# Patient Record
Sex: FEMALE | Race: WHITE | NOT HISPANIC OR LATINO | Employment: UNEMPLOYED | URBAN - METROPOLITAN AREA
[De-identification: names, ages, dates, MRNs, and addresses within clinical notes are randomized per-mention and may not be internally consistent; named-entity substitution may affect disease eponyms.]

---

## 2021-05-30 ENCOUNTER — RECORDS - HEALTHEAST (OUTPATIENT)
Dept: ADMINISTRATIVE | Facility: CLINIC | Age: 46
End: 2021-05-30

## 2021-11-27 ENCOUNTER — HOSPITAL ENCOUNTER (EMERGENCY)
Facility: CLINIC | Age: 46
Discharge: HOME | End: 2021-11-27
Admitting: PHYSICIAN ASSISTANT
Payer: COMMERCIAL

## 2021-11-27 VITALS
SYSTOLIC BLOOD PRESSURE: 124 MMHG | OXYGEN SATURATION: 98 % | RESPIRATION RATE: 16 BRPM | WEIGHT: 150 LBS | DIASTOLIC BLOOD PRESSURE: 86 MMHG | HEART RATE: 94 BPM | TEMPERATURE: 98.1 F

## 2021-11-27 DIAGNOSIS — L03.90 CELLULITIS: ICD-10-CM

## 2021-11-27 PROCEDURE — 99284 EMERGENCY DEPT VISIT MOD MDM: CPT

## 2021-11-27 RX ORDER — CEPHALEXIN 500 MG/1
500 CAPSULE ORAL 4 TIMES DAILY
Qty: 40 CAPSULE | Refills: 0 | Status: SHIPPED | OUTPATIENT
Start: 2021-11-27 | End: 2021-12-07

## 2021-11-27 RX ORDER — SULFAMETHOXAZOLE/TRIMETHOPRIM 800-160 MG
1 TABLET ORAL 2 TIMES DAILY
Qty: 20 TABLET | Refills: 0 | Status: SHIPPED | OUTPATIENT
Start: 2021-11-27 | End: 2021-12-07

## 2021-11-27 ASSESSMENT — ENCOUNTER SYMPTOMS: FACIAL SWELLING: 1

## 2021-11-28 NOTE — DISCHARGE INSTRUCTIONS
Please place warm compress over the chin area 3-4 times a day to help bring the infection out to the surface of the skin.  Please take the antibiotics as directed.  If you feel that the pains, swelling is extending into the mouth, involving the throat or the tongue please return emergently.  I suspect that your symptoms should improve likely in the next 48 to 72 hours.  Please take the antibiotics to completion.

## 2021-11-28 NOTE — ED PROVIDER NOTES
EMERGENCY DEPARTMENT ENCOUNTER      NAME: Carolyn Chan  AGE: 46 year old female  YOB: 1975  MRN: [id number]  EVALUATION DATE & TIME: 11/27/2021  5:32 PM    PCP: No primary care provider on file.    ED PROVIDER: Judson Saez PA-C      Chief Complaint   Patient presents with     Mass         FINAL IMPRESSION:  1. Cellulitis          MEDICAL DECISION MAKING:    Pertinent Labs & Imaging studies reviewed. (See chart for details)  46 year old female presents to the Emergency Department for evaluation of swelling, redness, warmth, tenderness to anterior chin.    After obtaining history of present illness, reviewing vitals and examined the patient it does appear that she has a start of a cellulitis on her chin that may be an early abscess.  No fluctuance, at this point time I would not recommend any type in imaging incision and drainage.  I counseled the patient regarding warm compresses and will initiate antibiotic therapy.  Counseled the patient that she should see improvement of symptoms in the next 48 to 72 hours.        ED COURSE    6:07 PM I met with the patient, obtained history, performed an initial exam, and discussed options and plan for diagnostics and treatment here in the ED. We discussed the plan for discharge and the patient is agreeable. Reviewed supportive cares, symptomatic treatment, outpatient follow up, and reasons to return to the Emergency Department. Patient to be discharged by ED RN.     At the conclusion of the encounter I discussed the results of all of the tests and the disposition. The questions were answered. The patient or family acknowledged understanding and was agreeable with the care plan.     MEDICATIONS GIVEN IN THE EMERGENCY:  Medications - No data to display    NEW PRESCRIPTIONS STARTED AT TODAY'S ER VISIT  Discharge Medication List as of 11/27/2021  6:24 PM      START taking these medications    Details   cephALEXin (KEFLEX) 500 MG capsule Take 1 capsule (500 mg)  by mouth 4 times daily for 10 days, Disp-40 capsule, R-0, Local Print      sulfamethoxazole-trimethoprim (BACTRIM DS) 800-160 MG tablet Take 1 tablet by mouth 2 times daily for 10 days, Disp-20 tablet, R-0, Local Print                  =================================================================    HPI    Patient information was obtained from: Patient    Use of Interpretor: N/A       Carolyn Chan is a 46 year old female with no pertinent history who presents to this ED via walk-in for evaluation of chin swelling.    Patient reports redness, swelling, and tenderness to chin that she noticed yesterday. She denies any recent injuries, dental pain, history of MRSA, recurrent skin infections, and allergies to medications. No other complaints at this time.    REVIEW OF SYSTEMS   Review of Systems   HENT: Positive for facial swelling (chin). Negative for dental problem.         Positive for redness and tenderness to chin   All other systems reviewed and are negative.     PAST MEDICAL HISTORY:  History reviewed. No pertinent past medical history.    PAST SURGICAL HISTORY:  History reviewed. No pertinent surgical history.      CURRENT MEDICATIONS:    No current facility-administered medications for this encounter.    Current Outpatient Medications:      cephALEXin (KEFLEX) 500 MG capsule, Take 1 capsule (500 mg) by mouth 4 times daily for 10 days, Disp: 40 capsule, Rfl: 0     sulfamethoxazole-trimethoprim (BACTRIM DS) 800-160 MG tablet, Take 1 tablet by mouth 2 times daily for 10 days, Disp: 20 tablet, Rfl: 0      ALLERGIES:  No Known Allergies    FAMILY HISTORY:  History reviewed. No pertinent family history.    SOCIAL HISTORY:   Social History     Socioeconomic History     Marital status: Single     Spouse name: Not on file     Number of children: Not on file     Years of education: Not on file     Highest education level: Not on file   Occupational History     Not on file   Tobacco Use     Smoking status: Not  on file     Smokeless tobacco: Not on file   Substance and Sexual Activity     Alcohol use: Not on file     Drug use: Not on file     Sexual activity: Not on file   Other Topics Concern     Not on file   Social History Narrative     Not on file     Social Determinants of Health     Financial Resource Strain: Not on file   Food Insecurity: Not on file   Transportation Needs: Not on file   Physical Activity: Not on file   Stress: Not on file   Social Connections: Not on file   Intimate Partner Violence: Not on file   Housing Stability: Not on file       VITALS:  Patient Vitals for the past 24 hrs:   BP Temp Temp src Pulse Resp SpO2 Weight   11/27/21 1738 124/86 98.1  F (36.7  C) Oral 94 16 98 % 68 kg (150 lb)       PHYSICAL EXAM    Physical Exam  Vitals and nursing note reviewed.   Constitutional:       General: She is not in acute distress.     Appearance: She is normal weight. She is not ill-appearing or toxic-appearing.   HENT:      Head: Normocephalic.      Right Ear: External ear normal.      Left Ear: External ear normal.      Nose: Nose normal.      Mouth/Throat:      Mouth: Mucous membranes are moist.      Pharynx: Oropharynx is clear.      Comments: There is no induration or tenderness to palpation of the floor of the mouth.  Tongue is not swollen.  No evidence of intraoral lesions.  There is evidence of diffuse dental decay and missing multiple teeth.  Eyes:      Conjunctiva/sclera: Conjunctivae normal.   Cardiovascular:      Rate and Rhythm: Normal rate.   Pulmonary:      Effort: Pulmonary effort is normal. No respiratory distress.      Breath sounds: No stridor.   Musculoskeletal:         General: No deformity or signs of injury. Normal range of motion.      Cervical back: Normal range of motion. No rigidity.   Lymphadenopathy:      Cervical: No cervical adenopathy.   Skin:     General: Skin is warm and dry.      Comments: Patient does have erythema, warmth, tenderness to the anterior portion of the chin  and there is a central punctate lesion that looks like it may become the head of an abscess.  There is no fluctuance.  No swelling or tenderness to the inferior portion of the mandible, nothing that extends into the neck.  Findings consistent with likely early cellulitis of the chin from a small early abscess.   Neurological:      General: No focal deficit present.      Mental Status: She is alert. Mental status is at baseline.      Sensory: No sensory deficit.      Motor: No weakness.   Psychiatric:         Mood and Affect: Mood normal.         Behavior: Behavior normal.          LAB:  All pertinent labs reviewed and interpreted.       RADIOLOGY:  Reviewed all pertinent imaging. Please see official radiology report.  No orders to display             Nancy MADDEN, am serving as a scribe to document services personally performed by Judson Saez PA-C based on my observation and the provider's statements to me. IJudson PA-C attest that Nancy Gonzales is acting in a scribe capacity, has observed my performance of the services and has documented them in accordance with my direction.    Judson Saez PA-C  Emergency Medicine  [facility]     Judson Saez PA-C  11/27/21 1922

## 2021-12-01 ENCOUNTER — HOSPITAL ENCOUNTER (EMERGENCY)
Facility: CLINIC | Age: 46
Discharge: HOME | End: 2021-12-02
Attending: EMERGENCY MEDICINE | Admitting: EMERGENCY MEDICINE
Payer: COMMERCIAL

## 2021-12-01 DIAGNOSIS — L02.01 FACIAL ABSCESS: ICD-10-CM

## 2021-12-01 PROCEDURE — 99283 EMERGENCY DEPT VISIT LOW MDM: CPT | Mod: 25

## 2021-12-01 PROCEDURE — 250N000009 HC RX 250: Performed by: EMERGENCY MEDICINE

## 2021-12-01 PROCEDURE — 10060 I&D ABSCESS SIMPLE/SINGLE: CPT

## 2021-12-01 RX ADMIN — Medication 3 ML: at 21:43

## 2021-12-01 ASSESSMENT — ENCOUNTER SYMPTOMS
FACIAL SWELLING: 1
NERVOUS/ANXIOUS: 0

## 2021-12-02 VITALS
SYSTOLIC BLOOD PRESSURE: 132 MMHG | DIASTOLIC BLOOD PRESSURE: 85 MMHG | TEMPERATURE: 98.2 F | RESPIRATION RATE: 16 BRPM | OXYGEN SATURATION: 98 % | HEART RATE: 100 BPM | WEIGHT: 150 LBS

## 2021-12-02 NOTE — ED TRIAGE NOTES
"Pt presents to the ED with C/O of rash on chin that started on the 25th of November, was prescribed antibiotics for it, but \"its not getting any better\"  "

## 2021-12-02 NOTE — ED PROVIDER NOTES
Emergency Department Encounter      NAME: Carolyn Chan  AGE: 46 year old female  YOB: 1975  MRN: [id number]  EVALUATION DATE & TIME: 2021  8:25 PM    PCP: System, Provider Not In    ED PROVIDER: Cuco Chisholm M.D.      Chief Complaint   Patient presents with     chin rash         FINAL IMPRESSION:  1. Facial abscess        MEDICAL DECISION MAKIN:10 PM I met with the patient, obtained history, performed an initial exam, and discussed options and plan for diagnostics and treatment here in the ED.   PPE: Provider wore gloves, and paper mask.     This patient is a 46-year-old female who presents with a rash on her chin.  She was seen on  for the chin swelling and it was thought that she might have an early abscess at that time.  She was started on Keflex and Bactrim.  She has been using the antibiotics regularly but says that the swelling and pain in the area has worsened.  She says that the tightness in the area of her chin makes it difficult for her to open her mouth.  On exam she appeared to have a abscess that was starting to point.  Patient was quite nervous the I&D procedure.  I used LAT initially and then injected lidocaine subcutaneously initially and then deeper.  I used an 18-gauge needle to probe the abscess and I was able to aspirate several cc of tannish-green thick purulent material.  After this I did a stab incision with a #15 blade and was able to get additional purulent material at all.  However the patient was not willing for me to enlarge the size of the incision or to pack the area.  Instead I was able to express as much of the purulent material as I could.  The patient wanted to finish the course of antibiotics and see if this would resolve the abscess instead of doing a more thorough job today.    Pertinent Labs & Imaging studies reviewed. (See chart for details)           The importance of close follow up was discussed. We reviewed warning signs and  "symptoms, and I instructed Ms. Chan to return to the emergency department immediately if she develops any new or worsening symptoms. I provided additional verbal discharge instructions. Ms. Chan expressed understanding and agreement with this plan of care, her questions were answered, and she was discharged in stable condition.         MEDICATIONS GIVEN IN THE EMERGENCY:  Medications   lidocaine/EPINEPHrine/tetracaine (LET) solution KIT 3 mL (3 mLs Topical Given 12/1/21 2143)       NEW PRESCRIPTIONS STARTED AT TODAY'S ER VISIT:  New Prescriptions    No medications on file          =================================================================    HPI    Patient information was obtained from: the patient     Use of : N/A      Carolyn Chan is a 46 year old female with no recorded past medical history, who presents to the ED via walk in for evaluation of a chin rash.     Per Chart Review:   11/27/2021 the patient presented to [facility] ED for evaluation of chin swelling. Her chin swelling began two days prior to presenting (11/25). She was discharged with Keflex and Bactrim for the start of cellulitis on her chin that might be an early abscess.     The patient reports that her chin swelling and pain has continued to worsen despite the two antibiotics she has been taking. Today the swelling has started moving into her jaw and gums, making it difficult to open her mouth. She also endorses little red spots on the bottom of her chin. She states that she \"wants it to pop,\" and is nervous that this swelling is cancer. The patient denies dental pain, and any other symptoms or complaints at this time.     REVIEW OF SYSTEMS   Review of Systems   Constitutional: Negative for chills, diaphoresis, fatigue and fever.   HENT: Positive for facial swelling (chin, jaw, gums). Negative for dental problem, sinus pressure, sinus pain and sore throat.         Positive for chin, jaw, and gums pain, trismus   Eyes: " Negative for pain, discharge, redness and itching.   Respiratory: Negative for chest tightness and shortness of breath.    Cardiovascular: Negative for chest pain.   Gastrointestinal: Negative for abdominal pain, nausea and vomiting.   Skin: Positive for rash (bottom of chin).   Psychiatric/Behavioral: The patient is not nervous/anxious.    All other systems reviewed and are negative.       PAST MEDICAL HISTORY:  History reviewed. No pertinent past medical history.    PAST SURGICAL HISTORY:  History reviewed. No pertinent surgical history.    CURRENT MEDICATIONS:    No current facility-administered medications for this encounter.    Current Outpatient Medications:      cephALEXin (KEFLEX) 500 MG capsule, Take 1 capsule (500 mg) by mouth 4 times daily for 10 days, Disp: 40 capsule, Rfl: 0     sulfamethoxazole-trimethoprim (BACTRIM DS) 800-160 MG tablet, Take 1 tablet by mouth 2 times daily for 10 days, Disp: 20 tablet, Rfl: 0    ALLERGIES:  No Known Allergies    FAMILY HISTORY:  History reviewed. No pertinent family history.    SOCIAL HISTORY:   Social History     Socioeconomic History     Marital status: Single     Spouse name: Not on file     Number of children: Not on file     Years of education: Not on file     Highest education level: Not on file   Occupational History     Not on file   Tobacco Use     Smoking status: Not on file     Smokeless tobacco: Not on file   Substance and Sexual Activity     Alcohol use: Not on file     Drug use: Not on file     Sexual activity: Not on file   Other Topics Concern     Not on file   Social History Narrative     Not on file     Social Determinants of Health     Financial Resource Strain: Not on file   Food Insecurity: Not on file   Transportation Needs: Not on file   Physical Activity: Not on file   Stress: Not on file   Social Connections: Not on file   Intimate Partner Violence: Not on file   Housing Stability: Not on file       PHYSICAL EXAM:    Vitals: BP (!) 155/96    Pulse 100   Temp 98.6  F (37  C) (Oral)   Resp 16   Wt 68 kg (150 lb)   LMP 11/17/2021   SpO2 100%    Constitutional: Well developed, well nourished. Comfortable appearing.  HENT: Tense, 3 cm swelling on the bottom right side of the chin which is warm, erythematous and pointing. Poor dentition but no obvious dental abscess. Sublingual area is normal.  The posterior pharynx is clear without any sign of erythema or swelling.  Normocephalic, atraumatic, mucous membranes moist, nose normal.   Neck- Supple, gross ROM intact.  No JVD.  No lymphadenopathy.  Normal thyroid.  Eyes: Pupils mid-range, sclera white, no discharge  Respiratory: Clear to auscultation bilaterally, no respiratory distress, no wheezing, speaks full sentences easily.  Cardiovascular: Normal heart rate, regular rhythm, no murmurs. No lower extremity edema, 2+ DP pulses.   GI: Soft, no tenderness to deep palpation in all quadrants, no masses.  Musculoskeletal: Moving all 4 extremities intentionally and without pain. No obvious deformity.  Skin: Warm, dry, no rash.     LAB:  All pertinent labs reviewed and interpreted.  Labs Ordered and Resulted from Time of ED Arrival to Time of ED Departure - No data to display    RADIOLOGY:  No orders to display       EKG:   None.     PROCEDURES:   [facility]    PROCEDURE: -Incision/Drainage    Date/Time: 12/1/2021 11:08 PM  Performed by: Cuco Chisholm MD  Authorized by: Cuco Chisholm MD     Risks, benefits and alternatives discussed.      LOCATION:      Type:  Abscess    Size:  3 cm    Location:  Head    Head/neck location: Chin.    PRE-PROCEDURE DETAILS:     Skin preparation:  Betadine    PROCEDURE TYPE:     Complexity:  Simple    ANESTHESIA (see MAR for exact dosages):     Anesthesia method:  Topical application and local infiltration    Topical anesthetic:  LET    Local anesthetic:  Lidocaine 1% WITH epi    PROCEDURE DETAILS:     Needle aspiration: yes      Needle size:   18 G    Incision types:  Stab incision    Incision depth:  Subcutaneous    Scalpel blade:  15    Drainage:  Purulent    Drainage amount:  Moderate    Wound treatment:  Wound left open    Packing materials:  None    PROCEDURE    Patient Tolerance:  Patient tolerated the procedure well with no immediate complications         I, Denia López, am serving as a scribe to document services personally performed by Dr. Cuco Chisholm based on my observation and the provider's statements to me. I, Cuco Chisholm M.D. attest that Denia López is acting in a scribe capacity, has observed my performance of the services and has documented them in accordance with my direction.      Cuco Chisholm M.D.  Emergency Medicine  [facility] EMERGENCY ROOM  [address] [address]  [phone]  Dept: [phone]       Cuco Chisholm MD  12/14/21 7627

## 2021-12-14 ASSESSMENT — ENCOUNTER SYMPTOMS
NAUSEA: 0
VOMITING: 0
SINUS PAIN: 0
SINUS PRESSURE: 0
EYE DISCHARGE: 0
SORE THROAT: 0
EYE PAIN: 0
CHILLS: 0
ABDOMINAL PAIN: 0
CHEST TIGHTNESS: 0
EYE REDNESS: 0
EYE ITCHING: 0
DIAPHORESIS: 0
SHORTNESS OF BREATH: 0
FATIGUE: 0
FEVER: 0

## 2022-03-10 ENCOUNTER — HOSPITAL ENCOUNTER (EMERGENCY)
Facility: CLINIC | Age: 47
Discharge: HOME | End: 2022-03-10
Attending: EMERGENCY MEDICINE | Admitting: EMERGENCY MEDICINE
Payer: COMMERCIAL

## 2022-03-10 VITALS
TEMPERATURE: 99 F | WEIGHT: 154 LBS | OXYGEN SATURATION: 98 % | SYSTOLIC BLOOD PRESSURE: 153 MMHG | DIASTOLIC BLOOD PRESSURE: 97 MMHG | HEIGHT: 67 IN | HEART RATE: 99 BPM | RESPIRATION RATE: 18 BRPM | BODY MASS INDEX: 24.17 KG/M2

## 2022-03-10 DIAGNOSIS — L02.01 ABSCESS OF CHIN: ICD-10-CM

## 2022-03-10 DIAGNOSIS — L03.90 CELLULITIS, UNSPECIFIED CELLULITIS SITE: ICD-10-CM

## 2022-03-10 PROCEDURE — 99284 EMERGENCY DEPT VISIT MOD MDM: CPT

## 2022-03-10 PROCEDURE — 250N000013 HC RX MED GY IP 250 OP 250 PS 637: Performed by: EMERGENCY MEDICINE

## 2022-03-10 RX ORDER — DOXYCYCLINE HYCLATE 50 MG/1
100 CAPSULE ORAL ONCE
Status: COMPLETED | OUTPATIENT
Start: 2022-03-10 | End: 2022-03-10

## 2022-03-10 RX ORDER — DOXYCYCLINE 100 MG/1
100 CAPSULE ORAL 2 TIMES DAILY
Qty: 20 CAPSULE | Refills: 0 | Status: SHIPPED | OUTPATIENT
Start: 2022-03-10 | End: 2022-03-20

## 2022-03-10 RX ORDER — CEPHALEXIN 500 MG/1
500 CAPSULE ORAL 4 TIMES DAILY
Qty: 40 CAPSULE | Refills: 0 | Status: SHIPPED | OUTPATIENT
Start: 2022-03-10 | End: 2022-03-20

## 2022-03-10 RX ORDER — CEPHALEXIN 500 MG/1
500 CAPSULE ORAL ONCE
Status: COMPLETED | OUTPATIENT
Start: 2022-03-10 | End: 2022-03-10

## 2022-03-10 RX ADMIN — CEPHALEXIN 500 MG: 500 CAPSULE ORAL at 01:49

## 2022-03-10 RX ADMIN — DOXYCYCLINE HYCLATE 100 MG: 50 CAPSULE ORAL at 01:49

## 2022-03-10 ASSESSMENT — ENCOUNTER SYMPTOMS: WOUND: 1

## 2022-03-10 NOTE — DISCHARGE INSTRUCTIONS
Please make an appointment with dermatology as discussed you will likely need to have this area opened and the complete sac removed in order to prevent recurrent abscesses in this area.  Otherwise please use warm compresses on the area to encourage drainage, take antibiotics as prescribed.  Return for any concerns.

## 2022-03-10 NOTE — ED TRIAGE NOTES
"Pt arrives with complaints of wound to chin. Pt was seen here in December for wound, it was lanced and pt states, \"It was doing better and than the last few days it was getting more swollen and now it had some pus come out.\" Denies any fever.   "

## 2022-03-10 NOTE — ED PROVIDER NOTES
EMERGENCY DEPARTMENT ENCOUNTER      NAME: Carolyn Chan  AGE: 46 year old female  YOB: 1975  MRN: [id number]  EVALUATION DATE & TIME: 3/10/2022  1:14 AM    PCP: System, Provider Not In    ED PROVIDER: Kandi Garland M.D.      Chief Complaint   Patient presents with     Wound Check         FINAL IMPRESSION:  1. Abscess of chin    2. Cellulitis, unspecified cellulitis site        MEDICAL DECISION MAKING:    Pertinent Labs & Imaging studies reviewed. (See chart for details)  ED Course as of 03/10/22 0452   Thu Mar 10, 2022   0126 Afebrile.  Vital signs here with some slight tachycardia, mild hypertension.  Patient is coming in for evaluation of recurrent abscess to chin.  Is been ongoing for the last several months.  She fell initially and stabbed her chin with a bit of steel wool.  She has had multiple recurrent abscesses in the area.  Has not been to see a dermatologist.  Has underwent gone several rounds of antibiotics without any significant improvement.  Has had multiple I&D's but states that the area comes back.  Noted today that it became more red, slightly more swollen and she had some pus that drained from the area.Physical exam for patient here without any acute cardiopulmonary distress.  She is swelling to her anterior chin with a small area just to the right of midline where she has a dark eschar and slightly indented area to her chin with some surrounding erythema, warmth.  Area around this indurated without any significant fluctuance.  No drainage at this point.Explained to patient this is likely recurrent abscess and she needs to see dermatology so that they can try and get the sac out and this is likely why it keeps reoccurring.  It does look like she is developing some slight cellulitis possibly around this area.  With this dark eschar I am concerned for possible MRSA.  She has in the past been on Keflex and Bactrim, will try Keflex and doxy as she stated that the Bactrim did not  help.  Otherwise will discharge with instruction use warm compresses, take antibiotics and to follow-up with dermatology.           Critical care: 0 minutes excluding separately billable procedures.  Includes bedside management, time reviewing test results, review of records, discussing the case with staff, documenting the medical record and time spent with family members (or surrogate decision makers) discussing specific treatment issues.          ED COURSE:      1:21 AM  I met with the patient for the initial interview and physical examination. Discussed plan for treatment and workup in the ED.       1:28 AM I discussed the plan for discharge with the patient, and patient is agreeable. We discussed supportive cares at home and reasons for return to the ER including new or worsening symptoms - all questions and concerns addressed. Patient to be discharged by RN.    The importance of close follow up was discussed. We reviewed warning signs and symptoms, and I instructed Ms. Chan to return to the emergency department immediately if she develops any new or worsening symptoms. I provided additional verbal discharge instructions. Ms. Chan expressed understanding and agreement with this plan of care, her questions were answered, and she was discharged in stable condition.       PPE: Provider wore gloves, N95 mask, eye protection, and paper mask.    MEDICATIONS GIVEN IN THE EMERGENCY:  Medications   cephALEXin (KEFLEX) capsule 500 mg (500 mg Oral Given 3/10/22 0149)   doxycycline hyclate (VIBRAMYCIN) capsule 100 mg (100 mg Oral Given 3/10/22 0149)       NEW PRESCRIPTIONS STARTED AT TODAY'S ER VISIT:  Discharge Medication List as of 3/10/2022  1:52 AM      START taking these medications    Details   cephALEXin (KEFLEX) 500 MG capsule Take 1 capsule (500 mg) by mouth 4 times daily for 10 days, Disp-40 capsule, R-0, Local Print      doxycycline hyclate (VIBRAMYCIN) 100 MG capsule Take 1 capsule (100 mg) by mouth 2  times daily for 10 days, Disp-20 capsule, R-0, Local Print                =================================================================    HPI    Patient information was obtained from: The patient    Use of : N/A       Carolyn Chan is a 46 year old female with no recorded pertient medical history who presents to the ED via walk-in for evaluation of a wound check.    The patient reports that in December of 2021 she was cleaning her shower with a metal brush when she tripped on her cat and the brush poked her chin. Since the incident, the wound on her chin has become infected and she has had it drained and she has taken prescribed antibiotics. However, the wound has not healed and this persistent nature has prompted her to present tonight to the ED. She notes that the wound has been draining and today (3/9) it was green bloody drainage. The patient denies any other symptoms or complaints at this time.     Per Chart Review,  12/01/2021 The patient presented to [facility]'s ER for evaluation of facial abscess. She was on Keflex and Bactrim prior to the evaluation and was advised to finish the course. Provider dizziness an incision and drainage.       REVIEW OF SYSTEMS   Review of Systems   Skin: Positive for wound (on chin with drainage).         PAST MEDICAL HISTORY:  History reviewed. No pertinent past medical history.    PAST SURGICAL HISTORY:  History reviewed. No pertinent surgical history.    CURRENT MEDICATIONS:    No current facility-administered medications for this encounter.    Current Outpatient Medications:      cephALEXin (KEFLEX) 500 MG capsule, Take 1 capsule (500 mg) by mouth 4 times daily for 10 days, Disp: 40 capsule, Rfl: 0     doxycycline hyclate (VIBRAMYCIN) 100 MG capsule, Take 1 capsule (100 mg) by mouth 2 times daily for 10 days, Disp: 20 capsule, Rfl: 0    ALLERGIES:  No Known Allergies    FAMILY HISTORY:  History reviewed. No pertinent family history.    SOCIAL HISTORY:  "  Social History     Socioeconomic History     Marital status: Single     Spouse name: Not on file     Number of children: Not on file     Years of education: Not on file     Highest education level: Not on file   Occupational History     Not on file   Tobacco Use     Smoking status: Not on file     Smokeless tobacco: Not on file   Substance and Sexual Activity     Alcohol use: Not on file     Drug use: Not on file     Sexual activity: Not on file   Other Topics Concern     Not on file   Social History Narrative     Not on file     Social Determinants of Health     Financial Resource Strain: Not on file   Food Insecurity: Not on file   Transportation Needs: Not on file   Physical Activity: Not on file   Stress: Not on file   Social Connections: Not on file   Intimate Partner Violence: Not on file   Housing Stability: Not on file       PHYSICAL EXAM:    Vitals: BP (!) 153/97   Pulse 99   Temp 99  F (37.2  C) (Oral)   Resp 18   Ht 1.702 m (5' 7\")   Wt 69.9 kg (154 lb)   SpO2 98%   BMI 24.12 kg/m     General:. Alert and interactive, comfortable appearing.  HENT: Oropharynx without erythema or exudates. MMM.  TMs clear bilaterally. Has swelling to her anterior chin with a small area just to the right of midline where she has a dark eschar and slightly indented area to her chin with some surrounding erythema and warmth. Area around this indurated without any significant fluctuance. No drainage at this point.  Eyes: Pupils mid-sized and equally reactive.   Neck: Full AROM.  No midline tenderness to palpation.  Cardiovascular: Regular rate and rhythm. Peripheral pulses 2+ bilaterally. No cardiopulmonary distress.  Chest/Pulmonary: Normal work of breathing. Lung sounds clear and equal throughout, no wheezes or crackles. No chest wall tenderness or deformities.  Abdomen: Soft, nondistended. Nontender without guarding or rebound.  Back/Spine: No CVA or midline tenderness.  Extremities: Normal ROM of all major joints. " No lower extremity edema.   Skin: Warm and dry. Normal skin color.   Neuro: Speech clear. CNs grossly intact. Moves all extremities appropriately. Strength and sensation grossly intact to all extremities.   Psych: Normal affect/mood, cooperative, memory appropriate.     LAB:  All pertinent labs reviewed and interpreted.  Labs Ordered and Resulted from Time of ED Arrival to Time of ED Departure - No data to display    RADIOLOGY:  No orders to display       EKG:  See MDM  None      PROCEDURES:   None      I, Nicola Suarez, am serving as a scribe to document services personally performed by Dr. Kandi Garland  based on my observation and the provider's statements to me. I, Kandi Garland MD attest that Nicola Suarez is acting in a scribe capacity, has observed my performance of the services and has documented them in accordance with my direction.      Kandi Garland M.D.  Emergency Medicine  [facility] EMERGENCY ROOM  [address] [id number] [phone]  Dept: [phone]     Vero Garland MD  03/10/22 0456